# Patient Record
Sex: MALE | Race: WHITE | URBAN - METROPOLITAN AREA
[De-identification: names, ages, dates, MRNs, and addresses within clinical notes are randomized per-mention and may not be internally consistent; named-entity substitution may affect disease eponyms.]

---

## 2021-01-04 ENCOUNTER — NEW PATIENT (OUTPATIENT)
Dept: URBAN - METROPOLITAN AREA CLINIC 24 | Facility: CLINIC | Age: 71
End: 2021-01-04
Payer: MEDICARE

## 2021-01-04 DIAGNOSIS — B00.52 HERPESVIRAL KERATITIS: ICD-10-CM

## 2021-01-04 DIAGNOSIS — H35.372 PUCKERING OF MACULA, LEFT EYE: ICD-10-CM

## 2021-01-04 DIAGNOSIS — H43.812 VITREOUS DEGENERATION, LEFT EYE: Primary | ICD-10-CM

## 2021-01-04 DIAGNOSIS — H17.822 PERIPHERAL OPACITY OF CORNEA, LEFT EYE: ICD-10-CM

## 2021-01-04 PROCEDURE — 92134 CPTRZ OPH DX IMG PST SGM RTA: CPT | Performed by: OPTOMETRIST

## 2021-01-04 PROCEDURE — 92004 COMPRE OPH EXAM NEW PT 1/>: CPT | Performed by: OPTOMETRIST

## 2021-01-04 ASSESSMENT — KERATOMETRY
OS: 44.05
OD: 41.55

## 2021-01-04 ASSESSMENT — VISUAL ACUITY
OS: 20/20
OD: 20/20

## 2021-01-04 ASSESSMENT — INTRAOCULAR PRESSURE
OS: 15
OD: 15

## 2021-12-27 ENCOUNTER — OFFICE VISIT (OUTPATIENT)
Dept: URBAN - METROPOLITAN AREA CLINIC 24 | Facility: CLINIC | Age: 71
End: 2021-12-27
Payer: MEDICARE

## 2021-12-27 DIAGNOSIS — H52.4 PRESBYOPIA: ICD-10-CM

## 2021-12-27 DIAGNOSIS — H02.055 TRICHIASIS WITHOUT ENTROPION LEFT LOWER EYELID: ICD-10-CM

## 2021-12-27 DIAGNOSIS — Z96.1 PRESENCE OF INTRAOCULAR LENS: ICD-10-CM

## 2021-12-27 DIAGNOSIS — H53.469 HOMONYMOUS HEMIANOPSIA: Primary | ICD-10-CM

## 2021-12-27 DIAGNOSIS — I63.9 CEREBRAL INFARCTION: ICD-10-CM

## 2021-12-27 PROCEDURE — 67820 REVISE EYELASHES: CPT | Performed by: OPTOMETRIST

## 2021-12-27 PROCEDURE — 99214 OFFICE O/P EST MOD 30 MIN: CPT | Performed by: OPTOMETRIST

## 2021-12-27 PROCEDURE — 92083 EXTENDED VISUAL FIELD XM: CPT | Performed by: OPTOMETRIST

## 2021-12-27 PROCEDURE — 92134 CPTRZ OPH DX IMG PST SGM RTA: CPT | Performed by: OPTOMETRIST

## 2021-12-27 PROCEDURE — 92025 CPTRIZED CORNEAL TOPOGRAPHY: CPT | Performed by: OPTOMETRIST

## 2021-12-27 ASSESSMENT — KERATOMETRY
OD: 41.93
OS: 42.94

## 2021-12-27 ASSESSMENT — INTRAOCULAR PRESSURE
OS: 15
OD: 16

## 2021-12-27 ASSESSMENT — VISUAL ACUITY
OS: 20/25
OD: 20/20

## 2021-12-27 NOTE — IMPRESSION/PLAN
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 Plan: Lash epilated in office w/ forceps.

## 2021-12-27 NOTE — IMPRESSION/PLAN
Impression: Presbyopia: H52.4. Plan: Increased difficulty w/ reading; will try NVO SRx per discussion.

## 2021-12-27 NOTE — IMPRESSION/PLAN
Impression: RIGHT Homonymous hemianopsia: H53.469.
-- onset June '21; 2/2 CVA. Pt reports inpatient care x 3 weeks. Plan: Known issue. Baseline 30-2 HVF demonstrated to patient. Pt advised not currently legal to drive considering peripheral field loss; pt acknowledges understanding. Recommend repeat HVF 6 months.

## 2021-12-27 NOTE — IMPRESSION/PLAN
Impression: Puckering of macula, left eye Plan: ERM present. No cme, thickening, or complaints of metamorphopsia. Pt advised of condition. Will monitor. Notify clinic if any changes noted. - mild stable.

## 2021-12-27 NOTE — IMPRESSION/PLAN
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 Updated lisbet today; irregular cyl present. Monitor; notify clinic any recurrence of symptoms.

## 2021-12-27 NOTE — IMPRESSION/PLAN
Impression: Presence of intraocular lens ; OU
-- s/p YAG OU, Crystalens IOL (monovision aim) Plan: Well positioned PC IOL(s).

## 2022-08-02 ENCOUNTER — OFFICE VISIT (OUTPATIENT)
Dept: URBAN - METROPOLITAN AREA CLINIC 24 | Facility: CLINIC | Age: 72
End: 2022-08-02
Payer: MEDICARE

## 2022-08-02 DIAGNOSIS — H53.469 HOMONYMOUS HEMIANOPSIA: Primary | ICD-10-CM

## 2022-08-02 DIAGNOSIS — I63.9 CEREBRAL INFARCTION: ICD-10-CM

## 2022-08-02 DIAGNOSIS — H52.4 PRESBYOPIA: ICD-10-CM

## 2022-08-02 PROCEDURE — 99213 OFFICE O/P EST LOW 20 MIN: CPT | Performed by: OPTOMETRIST

## 2022-08-02 PROCEDURE — 92083 EXTENDED VISUAL FIELD XM: CPT | Performed by: OPTOMETRIST

## 2022-08-02 ASSESSMENT — INTRAOCULAR PRESSURE
OD: 13
OS: 14

## 2022-08-02 NOTE — IMPRESSION/PLAN
Impression: RIGHT Homonymous hemianopsia: H53.469.
-- onset June '21; 2/2 CVA. Pt reports inpatient care x 3 weeks. Plan: Known issue. Repeat [facility] 30-2 today; no change. Pt edu / field demonstrated. Pt advised not currently legal to drive considering peripheral field loss; pt acknowledges understanding.

## 2022-08-02 NOTE — IMPRESSION/PLAN
Impression: Presbyopia: H52.4. Plan: Issues w/ recently obtained (outside Rx) PALS. 
Again, consider NVO Rx

## 2023-05-11 ENCOUNTER — OFFICE VISIT (OUTPATIENT)
Dept: URBAN - METROPOLITAN AREA CLINIC 24 | Facility: CLINIC | Age: 73
End: 2023-05-11
Payer: COMMERCIAL

## 2023-05-11 DIAGNOSIS — H35.372 PUCKERING OF MACULA, LEFT EYE: ICD-10-CM

## 2023-05-11 DIAGNOSIS — H50.52 EXOPHORIA: ICD-10-CM

## 2023-05-11 DIAGNOSIS — H53.469 HOMONYMOUS HEMIANOPSIA: Primary | ICD-10-CM

## 2023-05-11 DIAGNOSIS — I63.9 CEREBRAL INFARCTION: ICD-10-CM

## 2023-05-11 PROCEDURE — 92083 EXTENDED VISUAL FIELD XM: CPT | Performed by: OPTOMETRIST

## 2023-05-11 PROCEDURE — 99214 OFFICE O/P EST MOD 30 MIN: CPT | Performed by: OPTOMETRIST

## 2023-05-11 PROCEDURE — 92134 CPTRZ OPH DX IMG PST SGM RTA: CPT | Performed by: OPTOMETRIST

## 2023-05-11 ASSESSMENT — KERATOMETRY
OS: 43.10
OD: 41.85

## 2023-05-11 ASSESSMENT — INTRAOCULAR PRESSURE
OS: 12
OD: 10

## 2023-05-11 ASSESSMENT — VISUAL ACUITY
OD: 20/40
OS: 20/30

## 2023-05-11 NOTE — IMPRESSION/PLAN
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
-- inarticulate patient (cognition issues following CVA); reports probable intermittent distance diplopia
-- gca symptoms negative
-- eoms from, nml pupils, no ptosis or proptosis -- DCT 8 XP, 3 RHP Plan: In office prism trial; pt comfortable fused with 6 BI, 1 BU OS; rx released. 
Continue ongoing care with neurology [facility]

## 2023-05-11 NOTE — IMPRESSION/PLAN
Impression: RIGHT Homonymous hemianopsia: H53.469.
-- onset June '21; 2/2 CVA. Pt reports inpatient care x 3 weeks. -- pt / wife report recent seizures Plan: Known issue. Repeat HVF 30-2 today; no change. Pt edu / field demonstrated. Pt advised not currently legal to drive considering peripheral field loss; pt acknowledges understanding.

## 2024-02-15 ENCOUNTER — OFFICE VISIT (OUTPATIENT)
Dept: URBAN - METROPOLITAN AREA CLINIC 24 | Facility: CLINIC | Age: 74
End: 2024-02-15
Payer: MEDICARE

## 2024-02-15 DIAGNOSIS — S05.02XA CORNEAL ABRASION OF LEFT EYE, INITIAL ENCOUNTER: ICD-10-CM

## 2024-02-15 DIAGNOSIS — B00.52 HERPESVIRAL KERATITIS: ICD-10-CM

## 2024-02-15 DIAGNOSIS — H02.005 ENTROPION OF LT LOWER EYELID: Primary | ICD-10-CM

## 2024-02-15 DIAGNOSIS — H02.055 TRICHIASIS WITHOUT ENTROPION LEFT LOWER EYELID: ICD-10-CM

## 2024-02-15 PROCEDURE — 67820 REVISE EYELASHES: CPT | Performed by: OPTOMETRIST

## 2024-02-15 PROCEDURE — 92071 CONTACT LENS FITTING FOR TX: CPT | Performed by: OPTOMETRIST

## 2024-02-15 PROCEDURE — 99214 OFFICE O/P EST MOD 30 MIN: CPT | Performed by: OPTOMETRIST

## 2024-02-15 RX ORDER — VALACYCLOVIR 500 MG/1
500 MG TABLET, FILM COATED ORAL
Qty: 30 | Refills: 3 | Status: ACTIVE
Start: 2024-02-15

## 2024-02-15 RX ORDER — OFLOXACIN 3 MG/ML
0.3 % SOLUTION/ DROPS OPHTHALMIC
Qty: 5 | Refills: 2 | Status: ACTIVE
Start: 2024-02-15

## 2024-02-19 ENCOUNTER — OFFICE VISIT (OUTPATIENT)
Dept: URBAN - METROPOLITAN AREA CLINIC 24 | Facility: CLINIC | Age: 74
End: 2024-02-19
Payer: COMMERCIAL

## 2024-02-19 DIAGNOSIS — H02.005 ENTROPION OF LT LOWER EYELID: ICD-10-CM

## 2024-02-19 DIAGNOSIS — S05.02XA INJURY OF CONJUNCTIVA AND CORNEAL ABRASION WITHOUT FOREIGN BODY, LEFT EYE, INITIAL ENCOUNTER: ICD-10-CM

## 2024-02-19 DIAGNOSIS — B00.52 HERPESVIRAL KERATITIS: ICD-10-CM

## 2024-02-19 DIAGNOSIS — S05.02XD CORNEAL ABRASION OF LEFT EYE, SUBSEQUENT ENCOUNTER: Primary | ICD-10-CM

## 2024-02-19 PROCEDURE — 92071 CONTACT LENS FITTING FOR TX: CPT | Performed by: OPTOMETRIST

## 2024-02-19 PROCEDURE — 99214 OFFICE O/P EST MOD 30 MIN: CPT | Performed by: OPTOMETRIST

## 2024-02-22 ENCOUNTER — OFFICE VISIT (OUTPATIENT)
Dept: URBAN - METROPOLITAN AREA CLINIC 10 | Facility: CLINIC | Age: 74
End: 2024-02-22
Payer: COMMERCIAL

## 2024-02-22 DIAGNOSIS — H02.035 SENILE ENTROPION OF LEFT LOWER EYELID: Primary | ICD-10-CM

## 2024-02-22 PROCEDURE — 99204 OFFICE O/P NEW MOD 45 MIN: CPT | Performed by: STUDENT IN AN ORGANIZED HEALTH CARE EDUCATION/TRAINING PROGRAM

## 2024-02-22 PROCEDURE — 92285 EXTERNAL OCULAR PHOTOGRAPHY: CPT | Performed by: STUDENT IN AN ORGANIZED HEALTH CARE EDUCATION/TRAINING PROGRAM

## 2024-02-22 RX ORDER — ERYTHROMYCIN 5 MG/G
OINTMENT OPHTHALMIC
Qty: 3.5 | Refills: 3 | Status: ACTIVE
Start: 2024-02-22

## 2024-03-14 ENCOUNTER — OFFICE VISIT (OUTPATIENT)
Dept: URBAN - METROPOLITAN AREA CLINIC 24 | Facility: CLINIC | Age: 74
End: 2024-03-14
Payer: COMMERCIAL

## 2024-03-14 DIAGNOSIS — S05.02XD CORNEAL ABRASION OF LEFT EYE, SUBSEQUENT ENCOUNTER: Primary | ICD-10-CM

## 2024-03-14 DIAGNOSIS — H02.055 TRICHIASIS WITHOUT ENTROPION LEFT LOWER EYELID: ICD-10-CM

## 2024-03-14 DIAGNOSIS — H02.005 ENTROPION OF LT LOWER EYELID: ICD-10-CM

## 2024-03-14 PROCEDURE — 67820 REVISE EYELASHES: CPT | Performed by: OPTOMETRIST

## 2024-03-14 PROCEDURE — 99213 OFFICE O/P EST LOW 20 MIN: CPT | Performed by: OPTOMETRIST

## 2024-03-14 ASSESSMENT — INTRAOCULAR PRESSURE
OS: 15
OD: 12

## 2024-04-25 ENCOUNTER — OFFICE VISIT (OUTPATIENT)
Dept: URBAN - METROPOLITAN AREA CLINIC 24 | Facility: CLINIC | Age: 74
End: 2024-04-25
Payer: MEDICARE

## 2024-04-25 DIAGNOSIS — H02.005 ENTROPION OF LT LOWER EYELID: Primary | ICD-10-CM

## 2024-04-25 PROCEDURE — 99213 OFFICE O/P EST LOW 20 MIN: CPT | Performed by: OPTOMETRIST

## 2024-04-25 PROCEDURE — 67820 REVISE EYELASHES: CPT | Performed by: OPTOMETRIST

## 2024-04-25 ASSESSMENT — INTRAOCULAR PRESSURE
OD: 15
OS: 14

## 2024-05-23 ENCOUNTER — ADULT PHYSICAL (OUTPATIENT)
Dept: URBAN - METROPOLITAN AREA CLINIC 24 | Facility: CLINIC | Age: 74
End: 2024-05-23
Payer: MEDICARE

## 2024-05-23 DIAGNOSIS — Z01.818 ENCOUNTER FOR OTHER PREPROCEDURAL EXAMINATION: Primary | ICD-10-CM

## 2024-05-23 DIAGNOSIS — H02.035 SENILE ENTROPION OF LEFT LOWER EYELID: ICD-10-CM

## 2024-05-23 PROCEDURE — 99203 OFFICE O/P NEW LOW 30 MIN: CPT | Performed by: PHYSICIAN ASSISTANT
